# Patient Record
Sex: FEMALE | Race: BLACK OR AFRICAN AMERICAN | NOT HISPANIC OR LATINO | Employment: FULL TIME | URBAN - METROPOLITAN AREA
[De-identification: names, ages, dates, MRNs, and addresses within clinical notes are randomized per-mention and may not be internally consistent; named-entity substitution may affect disease eponyms.]

---

## 2020-01-30 ENCOUNTER — HOSPITAL ENCOUNTER (OUTPATIENT)
Facility: MEDICAL CENTER | Age: 35
End: 2020-01-30
Attending: PREVENTIVE MEDICINE
Payer: COMMERCIAL

## 2020-01-30 ENCOUNTER — EH NON-PROVIDER (OUTPATIENT)
Dept: OCCUPATIONAL MEDICINE | Facility: CLINIC | Age: 35
End: 2020-01-30

## 2020-01-30 DIAGNOSIS — Z02.89 ENCOUNTER FOR OCCUPATIONAL HEALTH EXAMINATION: Primary | ICD-10-CM

## 2020-01-30 DIAGNOSIS — Z02.1 PRE-EMPLOYMENT DRUG SCREENING: ICD-10-CM

## 2020-01-30 DIAGNOSIS — Z02.89 ENCOUNTER FOR OCCUPATIONAL HEALTH EXAMINATION: ICD-10-CM

## 2020-01-30 LAB
AMP AMPHETAMINE: NORMAL
BAR BARBITURATES: NORMAL
BZO BENZODIAZEPINES: NORMAL
COC COCAINE: NORMAL
INT CON NEG: NORMAL
INT CON POS: NORMAL
MDMA ECSTASY: NORMAL
MET METHAMPHETAMINES: NORMAL
MTD METHADONE: NORMAL
OPI OPIATES: NORMAL
OXY OXYCODONE: NORMAL
PCP PHENCYCLIDINE: NORMAL
POC URINE DRUG SCREEN OCDRS: NORMAL
THC: NORMAL

## 2020-01-30 PROCEDURE — 94375 RESPIRATORY FLOW VOLUME LOOP: CPT | Performed by: PREVENTIVE MEDICINE

## 2020-01-30 PROCEDURE — 86480 TB TEST CELL IMMUN MEASURE: CPT | Performed by: PREVENTIVE MEDICINE

## 2020-01-30 PROCEDURE — 86787 VARICELLA-ZOSTER ANTIBODY: CPT | Performed by: PREVENTIVE MEDICINE

## 2020-01-30 PROCEDURE — 80305 DRUG TEST PRSMV DIR OPT OBS: CPT | Performed by: PREVENTIVE MEDICINE

## 2020-01-31 LAB — VZV IGG SER IA-ACNC: 3.2

## 2020-02-02 LAB
GAMMA INTERFERON BACKGROUND BLD IA-ACNC: 0.02 IU/ML
M TB IFN-G BLD-IMP: NEGATIVE
M TB IFN-G CD4+ BCKGRND COR BLD-ACNC: 0 IU/ML
MITOGEN IGNF BCKGRD COR BLD-ACNC: >10 IU/ML
QFT TB2 - NIL TBQ2: 0 IU/ML

## 2020-02-03 ENCOUNTER — HOSPITAL ENCOUNTER (OUTPATIENT)
Facility: MEDICAL CENTER | Age: 35
End: 2020-02-03
Attending: NURSE PRACTITIONER
Payer: COMMERCIAL

## 2020-02-03 ENCOUNTER — EH NON-PROVIDER (OUTPATIENT)
Dept: OCCUPATIONAL MEDICINE | Facility: CLINIC | Age: 35
End: 2020-02-03

## 2020-02-03 DIAGNOSIS — Z02.89 VISIT FOR OCCUPATIONAL HEALTH EXAMINATION: ICD-10-CM

## 2020-02-03 DIAGNOSIS — Z02.89 VISIT FOR OCCUPATIONAL HEALTH EXAMINATION: Primary | ICD-10-CM

## 2020-02-03 LAB
ALBUMIN SERPL BCP-MCNC: 4.4 G/DL (ref 3.2–4.9)
ALBUMIN/GLOB SERPL: 1.1 G/DL
ALP SERPL-CCNC: 73 U/L (ref 30–99)
ALT SERPL-CCNC: 11 U/L (ref 2–50)
ANION GAP SERPL CALC-SCNC: 8 MMOL/L (ref 0–11.9)
AST SERPL-CCNC: 19 U/L (ref 12–45)
BILIRUB SERPL-MCNC: 0.3 MG/DL (ref 0.1–1.5)
BUN SERPL-MCNC: 8 MG/DL (ref 8–22)
CALCIUM SERPL-MCNC: 10 MG/DL (ref 8.5–10.5)
CHLORIDE SERPL-SCNC: 104 MMOL/L (ref 96–112)
CO2 SERPL-SCNC: 23 MMOL/L (ref 20–33)
CREAT SERPL-MCNC: 0.65 MG/DL (ref 0.5–1.4)
GLOBULIN SER CALC-MCNC: 3.9 G/DL (ref 1.9–3.5)
GLUCOSE SERPL-MCNC: 80 MG/DL (ref 65–99)
POTASSIUM SERPL-SCNC: 4 MMOL/L (ref 3.6–5.5)
PROT SERPL-MCNC: 8.3 G/DL (ref 6–8.2)
SODIUM SERPL-SCNC: 135 MMOL/L (ref 135–145)

## 2020-02-03 PROCEDURE — 80053 COMPREHEN METABOLIC PANEL: CPT | Performed by: NURSE PRACTITIONER

## 2020-02-03 PROCEDURE — 80053 COMPREHEN METABOLIC PANEL: CPT

## 2020-02-04 ENCOUNTER — EMPLOYEE HEALTH (OUTPATIENT)
Dept: OCCUPATIONAL MEDICINE | Facility: CLINIC | Age: 35
End: 2020-02-04

## 2020-02-04 DIAGNOSIS — Z02.1 PRE-EMPLOYMENT HEALTH SCREENING EXAMINATION: ICD-10-CM

## 2020-02-04 PROCEDURE — 8915 PR COMPREHENSIVE PHYSICAL: Performed by: NURSE PRACTITIONER

## 2020-10-09 ENCOUNTER — IMMUNIZATION (OUTPATIENT)
Dept: OCCUPATIONAL MEDICINE | Facility: CLINIC | Age: 35
End: 2020-10-09

## 2020-10-09 DIAGNOSIS — Z23 NEED FOR VACCINATION: ICD-10-CM

## 2020-10-09 PROCEDURE — 90686 IIV4 VACC NO PRSV 0.5 ML IM: CPT | Performed by: NURSE PRACTITIONER

## 2020-12-20 DIAGNOSIS — Z23 NEED FOR VACCINATION: ICD-10-CM

## 2021-03-06 ENCOUNTER — EH NON-PROVIDER (OUTPATIENT)
Dept: OCCUPATIONAL MEDICINE | Facility: CLINIC | Age: 36
End: 2021-03-06

## 2021-03-06 DIAGNOSIS — Z02.89 ENCOUNTER FOR OCCUPATIONAL HEALTH EXAMINATION INVOLVING RESPIRATOR: ICD-10-CM

## 2021-03-06 PROCEDURE — 94375 RESPIRATORY FLOW VOLUME LOOP: CPT | Performed by: PREVENTIVE MEDICINE

## 2021-09-22 ENCOUNTER — IMMUNIZATION (OUTPATIENT)
Dept: OCCUPATIONAL MEDICINE | Facility: CLINIC | Age: 36
End: 2021-09-22

## 2021-09-22 DIAGNOSIS — Z23 NEED FOR VACCINATION: Primary | ICD-10-CM

## 2021-09-22 PROCEDURE — 90686 IIV4 VACC NO PRSV 0.5 ML IM: CPT | Performed by: PREVENTIVE MEDICINE

## 2022-02-24 ENCOUNTER — HOSPITAL ENCOUNTER (EMERGENCY)
Facility: MEDICAL CENTER | Age: 37
End: 2022-02-24
Attending: EMERGENCY MEDICINE
Payer: COMMERCIAL

## 2022-02-24 ENCOUNTER — NON-PROVIDER VISIT (OUTPATIENT)
Dept: OCCUPATIONAL MEDICINE | Facility: CLINIC | Age: 37
End: 2022-02-24
Payer: COMMERCIAL

## 2022-02-24 VITALS
WEIGHT: 179.01 LBS | OXYGEN SATURATION: 99 % | BODY MASS INDEX: 28.1 KG/M2 | DIASTOLIC BLOOD PRESSURE: 81 MMHG | RESPIRATION RATE: 15 BRPM | HEIGHT: 67 IN | HEART RATE: 73 BPM | TEMPERATURE: 97.7 F | SYSTOLIC BLOOD PRESSURE: 139 MMHG

## 2022-02-24 DIAGNOSIS — Z02.89 VISIT FOR OCCUPATIONAL HEALTH EXAMINATION: Primary | ICD-10-CM

## 2022-02-24 DIAGNOSIS — Z77.21 EXPOSURE TO BLOOD OR BODY FLUID: ICD-10-CM

## 2022-02-24 LAB
ALBUMIN SERPL BCP-MCNC: 4.6 G/DL (ref 3.2–4.9)
ALBUMIN/GLOB SERPL: 1.2 G/DL
ALP SERPL-CCNC: 80 U/L (ref 30–99)
ALT SERPL-CCNC: 20 U/L (ref 2–50)
ANION GAP SERPL CALC-SCNC: 12 MMOL/L (ref 7–16)
AST SERPL-CCNC: 43 U/L (ref 12–45)
BILIRUB SERPL-MCNC: <0.2 MG/DL (ref 0.1–1.5)
BUN SERPL-MCNC: 18 MG/DL (ref 8–22)
CALCIUM SERPL-MCNC: 9.7 MG/DL (ref 8.5–10.5)
CHLORIDE SERPL-SCNC: 106 MMOL/L (ref 96–112)
CO2 SERPL-SCNC: 20 MMOL/L (ref 20–33)
CREAT SERPL-MCNC: 0.84 MG/DL (ref 0.5–1.4)
ERYTHROCYTE [DISTWIDTH] IN BLOOD BY AUTOMATED COUNT: 39.1 FL (ref 35.9–50)
GLOBULIN SER CALC-MCNC: 3.7 G/DL (ref 1.9–3.5)
GLUCOSE SERPL-MCNC: 87 MG/DL (ref 65–99)
HBV CORE AB SERPL QL IA: NONREACTIVE
HBV SURFACE AB SERPL IA-ACNC: 20 MIU/ML (ref 0–10)
HBV SURFACE AG SER QL: ABNORMAL
HCT VFR BLD AUTO: 30 % (ref 37–47)
HCV AB SER QL: ABNORMAL
HGB BLD-MCNC: 8.6 G/DL (ref 12–16)
HIV 1+2 AB+HIV1 P24 AG SERPL QL IA: ABNORMAL
MCH RBC QN AUTO: 18.9 PG (ref 27–33)
MCHC RBC AUTO-ENTMCNC: 28.7 G/DL (ref 33.6–35)
MCV RBC AUTO: 65.9 FL (ref 81.4–97.8)
MORPHOLOGY BLD-IMP: NORMAL
PLATELET # BLD AUTO: 519 K/UL (ref 164–446)
PMV BLD AUTO: 11.6 FL (ref 9–12.9)
POTASSIUM SERPL-SCNC: 4.3 MMOL/L (ref 3.6–5.5)
PROT SERPL-MCNC: 8.3 G/DL (ref 6–8.2)
RBC # BLD AUTO: 4.55 M/UL (ref 4.2–5.4)
SODIUM SERPL-SCNC: 138 MMOL/L (ref 135–145)
WBC # BLD AUTO: 7.3 K/UL (ref 4.8–10.8)

## 2022-02-24 PROCEDURE — 87389 HIV-1 AG W/HIV-1&-2 AB AG IA: CPT

## 2022-02-24 PROCEDURE — 87340 HEPATITIS B SURFACE AG IA: CPT

## 2022-02-24 PROCEDURE — 80305 DRUG TEST PRSMV DIR OPT OBS: CPT | Performed by: PREVENTIVE MEDICINE

## 2022-02-24 PROCEDURE — 80053 COMPREHEN METABOLIC PANEL: CPT

## 2022-02-24 PROCEDURE — 82075 ASSAY OF BREATH ETHANOL: CPT | Performed by: PREVENTIVE MEDICINE

## 2022-02-24 PROCEDURE — 86706 HEP B SURFACE ANTIBODY: CPT

## 2022-02-24 PROCEDURE — 85027 COMPLETE CBC AUTOMATED: CPT

## 2022-02-24 PROCEDURE — 36415 COLL VENOUS BLD VENIPUNCTURE: CPT

## 2022-02-24 PROCEDURE — 99283 EMERGENCY DEPT VISIT LOW MDM: CPT

## 2022-02-24 PROCEDURE — 86704 HEP B CORE ANTIBODY TOTAL: CPT

## 2022-02-24 PROCEDURE — 86803 HEPATITIS C AB TEST: CPT

## 2022-02-24 NOTE — LETTER
"  FORM C-4:  EMPLOYEE’S CLAIM FOR COMPENSATION/ REPORT OF INITIAL TREATMENT  EMPLOYEE’S CLAIM - PROVIDE ALL INFORMATION REQUESTED   First Name Sara Last Name Nathanael Zavala Birthdate 1985  Sex female Claim Number   Home Address [address]   [facility]             Zip [address]                                   Age  36 y.o. Height  1.702 m (5' 7\") Weight  81.2 kg (179 lb 0.2 oz) [facility]  [id number]  [id number]   Mailing Address [id number]  [facility]              Zip [address] Telephone  [phone] (home)  Primary Language Spoken  English   Insurer   Third Party   WORKERS CHOICE Employee's Occupation (Job Title) When Injury or Occupational Disease Occurred  RN   Employer's Name [employer] Telephone [phone]    Employer Address [address] [[address]] Zip [address]   Date of Injury  2/23/2022       Hour of Injury  7:00 PM Date Employer Notified  2/24/2022 Last Day of Work after Injury or Occupational Disease  2/24/2022 Supervisor to Whom Injury Reported  Caesar Courtney   Address or Location of Accident (if applicable) Work [1]   What were you doing at the time of accident? (if applicable) Helping move a patient and removing tape    How did this injury or occupational disease occur? Be specific and answer in detail. Use additional sheet if necessary)  Was moving a patient and removing tape off of the patient.  Was stuck in the hand by one of the neuromonitoring needles.   If you believe that you have an occupational disease, when did you first have knowledge of the disability and it relationship to your employment? N/A Witnesses to the Accident  Was in room w/ multiple staff   Nature of Injury or Occupational Disease  Workers' Compensation Part(s) of Body Injured or Affected  Hand (L), N/A, N/A    I CERTIFY THAT THE ABOVE IS TRUE AND CORRECT TO THE BEST OF MY KNOWLEDGE AND THAT I HAVE PROVIDED THIS INFORMATION IN ORDER TO OBTAIN THE BENEFITS OF " [place]).  I HEREBY AUTHORIZE ANY PHYSICIAN, CHIROPRACTOR, SURGEON, PRACTITIONER, OR OTHER PERSON, ANY HOSPITAL, INCLUDING [facility] OR [facility], ANY MEDICAL SERVICE ORGANIZATION, ANY INSURANCE COMPANY, OR OTHER INSTITUTION OR ORGANIZATION TO RELEASE TO EACH OTHER, ANY MEDICAL OR OTHER INFORMATION, INCLUDING BENEFITS PAID OR PAYABLE, PERTINENT TO THIS INJURY OR DISEASE, EXCEPT INFORMATION RELATIVE TO DIAGNOSIS, TREATMENT AND/OR COUNSELING FOR AIDS, PSYCHOLOGICAL CONDITIONS, ALCOHOL OR CONTROLLED SUBSTANCES, FOR WHICH I MUST GIVE SPECIFIC AUTHORIZATION.  A PHOTOSTAT OF THIS AUTHORIZATION SHALL BE AS VALID AS THE ORIGINAL.  Date    2/24/2022                          Place              [facility]                                                               Employee’s Signature   THIS REPORT MUST BE COMPLETED AND MAILED WITHIN 3 WORKING DAYS OF TREATMENT   Place [facility], EMERGENCY DEPT                       Name of Facility [facility]   Date  2/24/2022 Diagnosis  (Z77.21) Exposure to blood or body fluid Is there evidence the injured employee was under the influence of alcohol and/or another controlled substance at the time of accident?   Hour  10:59 PM Description of Injury or Disease  Exposure to blood or body fluid No   Treatment  Medical screening  Have you advised the patient to remain off work five days or more?         No   X-Ray Findings    If Yes   From Date    To Date      From information given by the employee, together with medical evidence, can you directly connect this injury or occupational disease as job incurred? Yes If No, is employee capable of: Full Duty  Yes Modified Duty      Is additional medical care by a physician indicated? No If Modified Duty, Specify any Limitations / Restrictions       Do you know of any previous injury or  "disease contributing to this condition or occupational disease? No    Date 2/24/2022 Print Doctor’s Name Anil Smith certify the employer’s copy of this form was mailed on:   Address [address] [place] [address]  [place] [address] [phone] INSURER’S USE ONLY   Provider’s Tax ID Number   Telephone Dept: [phone]    Doctor’s Signature chris-ANIL Thomas M.D., MD      Form C-4 (rev.10/07)                                                                         BRIEF DESCRIPTION OF RIGHTS AND BENEFITS  (Pursuant to [place] 616C.050)    Notice of Injury or Occupational Disease (Incident Report Form C-1): If an injury or occupational disease (OD) arises out of and in the course of employment, you must provide written notice to your employer as soon as practicable, but no later than 7 days after the accident or OD. Your employer shall maintain a sufficient supply of the required forms.    Claim for Compensation (Form C-4): If medical treatment is sought, the form C-4 is available at the place of initial treatment. A completed \"Claim for Compensation\" (Form C-4) must be filed within 90 days after an accident or OD. The treating physician or chiropractor must, within 3 working days after treatment, complete and mail to the employer, the employer's insurer and third-party , the Claim for Compensation.    Medical Treatment: If you require medical treatment for your on-the-job injury or OD, you may be required to select a physician or chiropractor from a list provided by your workers’ compensation insurer, if it has contracted with an Organization for Managed Care (MCO) or Preferred Provider Organization (PPO) or providers of health care. If your employer has not entered into a contract with an MCO or PPO, you may select a physician or chiropractor from the Panel of Physicians and Chiropractors. Any medical costs related to your industrial injury or OD will be paid by your insurer.    Temporary Total " Disability (TTD): If your doctor has certified that you are unable to work for a period of at least 5 consecutive days, or 5 cumulative days in a 20-day period, or places restrictions on you that your employer does not accommodate, you may be entitled to TTD compensation.    Temporary Partial Disability (TPD): If the wage you receive upon reemployment is less than the compensation for TTD to which you are entitled, the insurer may be required to pay you TPD compensation to make up the difference. TPD can only be paid for a maximum of 24 months.    Permanent Partial Disability (PPD): When your medical condition is stable and there is an indication of a PPD as a result of your injury or OD, within 30 days, your insurer must arrange for an evaluation by a rating physician or chiropractor to determine the degree of your PPD. The amount of your PPD award depends on the date of injury, the results of the PPD evaluation, your age and wage.    Permanent Total Disability (PTD): If you are medically certified by a treating physician or chiropractor as permanently and totally disabled and have been granted a PTD status by your insurer, you are entitled to receive monthly benefits not to exceed 66 2/3% of your average monthly wage. The amount of your PTD payments is subject to reduction if you previously received a lump-sum PPD award.    Vocational Rehabilitation Services: You may be eligible for vocational rehabilitation services if you are unable to return to the job due to a permanent physical impairment or permanent restrictions as a result of your injury or occupational disease.    Transportation and Per Denny Reimbursement: You may be eligible for travel expenses and per denny associated with medical treatment.    Reopening: You may be able to reopen your claim if your condition worsens after claim closure.     Appeal Process: If you disagree with a written determination issued by the insurer or the insurer does not respond  to your request, you may appeal to the Department of Administration, , by following the instructions contained in your determination letter. You must appeal the determination within 70 days from the date of the determination letter at [address], [address], [place], [place] [address], or [address], [address], [place], [place] [address]. If you disagree with the  decision, you may appeal to the Department of Administration, . You must file your appeal within 30 days from the date of the  decision letter at [address], [address], [place], [place] [address], or [address], [address], [place], [place] [address]. If you disagree with a decision of an , you may file a petition for judicial review with the District Court. You must do so within 30 days of the Appeal Officer’s decision. You may be represented by an  at your own expense or you may contact the [facility] for possible representation.    [place]  for Injured Workers (NAIW): If you disagree with a  decision, you may request that NAIW represent you without charge at an  Hearing. For information regarding denial of benefits, you may contact the [facility] at: [address], [address], [place] [address], [phone], or [address], [address], [place] [address], [phone]    To File a Complaint with the Division: If you wish to file a complaint with the  of the Division of Industrial Relations (DIR),  please contact the Workers’ Compensation Section, [address], [address], [place], [place] [address], telephone [phone], or [address], [place], [place] [address], telephone [phone].    For assistance with Workers’ Compensation Issues: You may contact the [facility] Office for Consumer Health Assistance, [address], [place] [address], Toll Free 1-542.570.2373, Web site: http://[facility].[place].gov/Programs/SHER E-mail: sher@[facility].[place].gov  D-2 (rev. 10/20)              __________________________________________________________________                                    _________________            Employee Name / Signature                                                                                                                            Date

## 2022-02-25 LAB
AMP AMPHETAMINE: NORMAL
BAR BARBITURATES: NORMAL
BREATH ALCOHOL COMMENT: NORMAL
BZO BENZODIAZEPINES: NORMAL
COC COCAINE: NORMAL
INT CON NEG: NORMAL
INT CON POS: NORMAL
MDMA ECSTASY: NORMAL
MET METHAMPHETAMINES: NORMAL
MTD METHADONE: NORMAL
OPI OPIATES: NORMAL
OXY OXYCODONE: NORMAL
PCP PHENCYCLIDINE: NORMAL
POC BREATHALIZER: 0 PERCENT (ref 0–0.01)
POC URINE DRUG SCREEN OCDRS: NORMAL
THC: NORMAL

## 2022-02-25 NOTE — ED TRIAGE NOTES
"  Chief Complaint   Patient presents with   • Body Fluid Exposure     2/23 @ 1900     Pt to triage for above complaint. Pt [employer] employee, states she was moving a pt in OR last night and sustained needle stick in the hand. Workers comp. paperwork provided.     Pt is alert/oriented, following commands, and answering questions appropriately. No acute distress noted in triage and respirations are even and unlabored.   Pt placed in lobby and educated on triage process. Pt encouraged to alert staff for any changes in condition.    .BP (!) 198/122 Comment: Left arm. RN notified.  Pulse 95   Temp 36.8 °C (98.2 °F) (Temporal)   Resp 18   Ht 1.702 m (5' 7\")   Wt 81.2 kg (179 lb 0.2 oz)   LMP 02/10/2022   SpO2 100% Comment: Room air  BMI 28.04 kg/m²       "

## 2022-02-25 NOTE — ED PROVIDER NOTES
"ED Provider Note    CHIEF COMPLAINT  Chief Complaint   Patient presents with   • Body Fluid Exposure     2/23 @ 1900       HPI  Sara Zavala is a 36 y.o. female who presents to the emergency department after accidental needlestick while at work. 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 This occurred last night. Tonight decided that she should be seen formally due to the fact that this was a work-related incident. Otherwise feels well. States that the accidental needlestick site is unable to be visualized at this point.      REVIEW OF SYSTEMS  See HPI for further details. All other systems are negative.     PAST MEDICAL HISTORY   has a past medical history of Hypertension.    SOCIAL HISTORY  Social History     Tobacco Use   • Smoking status: Never Smoker   • Smokeless tobacco: Never Used   Vaping Use   • Vaping Use: Never used   Substance and Sexual Activity   • Alcohol use: Yes     Comment: Occaisonally    • Drug use: Never   • Sexual activity: Not on file       SURGICAL HISTORY  patient denies any surgical history    CURRENT MEDICATIONS  Home Medications     Reviewed by Raiza Yanez R.N. (Registered Nurse) on 02/24/22 at 3590  Med List Status: <None>   Medication Last Dose Status        Patient Jean-Pierre Taking any Medications                       ALLERGIES  No Known Allergies    PHYSICAL EXAM  VITAL SIGNS: BP (!) 198/122 Comment: Left arm. RN notified.  Pulse 95   Temp 36.8 °C (98.2 °F) (Temporal)   Resp 18   Ht 1.702 m (5' 7\")   Wt 81.2 kg (179 lb 0.2 oz)   LMP 02/10/2022   SpO2 100% Comment: Room air  BMI 28.04 kg/m²  @[place][[id number]::@  Pulse ox interpretation: I interpret this pulse ox as normal.  Constitutional: Alert in no apparent distress.  HENT: Normocephalic, Atraumatic  Lungs: no resp distress.   Skin: Warm, Dry.   Left hand: patient localizes needlestick site to the base of the fifth digit " on the lateral/ulnar palm are side of the hand.   Neurologic: Alert, Grossly non-focal.   Psychiatric: Affect normal, Judgment normal, Mood normal, Appears appropriate and not intoxicated.     Results for orders placed or performed during the hospital encounter of 02/24/22   BLOOD AND BODY FLUID EXPOSURE (EXPOSED- SOURCE PATIENT POS OR UNKNOWN)   Result Value Ref Range    HIV Ag/Ab Combo Assay Non-Reactive Non Reactive    Hepatitis C Antibody Non-Reactive Non-Reactive    Hepatitis B Surface Antigen Non-Reactive Non-Reactive    Hep B Surface Antibody Quant 20.00 (H) 0.00 - 10.00 mIU/mL    Hepatitis B Core Ab, Total NonReactive Non-Reactive    WBC 7.3 4.8 - 10.8 K/uL    RBC 4.55 4.20 - 5.40 M/uL    Hemoglobin 8.6 (L) 12.0 - 16.0 g/dL    Hematocrit 30.0 (L) 37.0 - 47.0 %    MCV 65.9 (L) 81.4 - 97.8 fL    MCH 18.9 (L) 27.0 - 33.0 pg    MCHC 28.7 (L) 33.6 - 35.0 g/dL    RDW 39.1 35.9 - 50.0 fL    Platelet Count 519 (H) 164 - 446 K/uL    MPV 11.6 9.0 - 12.9 fL    Sodium 138 135 - 145 mmol/L    Potassium 4.3 3.6 - 5.5 mmol/L    Chloride 106 96 - 112 mmol/L    Co2 20 20 - 33 mmol/L    Anion Gap 12.0 7.0 - 16.0    Glucose 87 65 - 99 mg/dL    Bun 18 8 - 22 mg/dL    Creatinine 0.84 0.50 - 1.40 mg/dL    Calcium 9.7 8.5 - 10.5 mg/dL    AST(SGOT) 43 12 - 45 U/L    ALT(SGPT) 20 2 - 50 U/L    Alkaline Phosphatase 80 30 - 99 U/L    Total Bilirubin <0.2 0.1 - 1.5 mg/dL    Albumin 4.6 3.2 - 4.9 g/dL    Total Protein 8.3 (H) 6.0 - 8.2 g/dL    Globulin 3.7 (H) 1.9 - 3.5 g/dL    A-G Ratio 1.2 g/dL   ESTIMATED GFR   Result Value Ref Range    GFR If African American >60 >60 mL/min/1.73 m 2    GFR If Non African American >60 >60 mL/min/1.73 m 2   PERIPHERAL SMEAR REVIEW   Result Value Ref Range    Peripheral Smear Review see below            COURSE & MEDICAL DECISION MAKING  Pertinent Labs & Imaging studies reviewed. (See chart for details)  36-year-old female presented the emergency department for accidental needlestick while at work here  in the hospital. History as above. Laboratory evaluations were initiated. She will follow-up with occupational health for further treatment if needed. Laboratory evaluation as above currently reassuring. Through sure decision-making we have deferred from antivirals at this time.       The patient will return for worsening symptoms and is stable at the time of discharge. The patient verbalizes understanding and will comply.    FINAL IMPRESSION  1. Exposure to blood or body fluid               Electronically signed by: Anil Smith M.D., 2/24/2022 10:41 PM

## 2022-02-25 NOTE — ED NOTES
Pt A/Ox3 unlaboured breathing VSS. Agrees and understands d/c teaching. Ambulated off unit independently

## 2022-03-14 ENCOUNTER — OCCUPATIONAL MEDICINE (OUTPATIENT)
Dept: OCCUPATIONAL MEDICINE | Facility: CLINIC | Age: 37
End: 2022-03-14
Payer: COMMERCIAL

## 2022-03-14 VITALS
WEIGHT: 179 LBS | BODY MASS INDEX: 28.09 KG/M2 | DIASTOLIC BLOOD PRESSURE: 120 MMHG | HEIGHT: 67 IN | SYSTOLIC BLOOD PRESSURE: 210 MMHG

## 2022-03-14 DIAGNOSIS — Z77.21 EXPOSURE TO BLOOD OR BODY FLUID: ICD-10-CM

## 2022-03-14 PROCEDURE — 99213 OFFICE O/P EST LOW 20 MIN: CPT | Performed by: NURSE PRACTITIONER

## 2022-03-14 ASSESSMENT — FIBROSIS 4 INDEX: FIB4 SCORE: 0.67

## 2022-03-14 NOTE — PROGRESS NOTES
"Subjective:     Sara Zavala is a 36 y.o. 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 Patient seen in Urgent Care x 1. Baseline labs reviewed negative for Hep C and HIV.  Patient outside of HIV prophylaxis window for treatment.  She verbalized her understanding.  She states that the source labs have been drawn she will attempt to get MRN from supervisor.  Tdap is up-to-date.  Discussed CDC postexposure repeat testing guidelines.  Patient is amenable to this.  She is tolerating full duty without difficulty.  Plan of care discussed with patient.     Source MRN # [id number] negative for Hep C and HIV    ROS: All systems were reviewed on intake form, form was reviewed and signed. See scanned documents in media. Pertinent positives and negatives included in HPI.    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  FH: No pertinent family history to this problem       Objective:     BP (!) 210/120   Ht 1.702 m (5' 7\")   Wt 81.2 kg (179 lb)   BMI 28.04 kg/m²     [unfilled]    Left hand: No gross deformity or discoloration noted.  No signs and symptoms of infection.  Skin is clean, dry, intact.    Assessment/Plan:       1. Exposure to blood or body fluid  - HIV ANITBODIES; Future  - HEP C VIRUS ANITBODY; Future    Released to Full Duty FROM 3/14/2022 TO 4/7/2022     Follow-up at 6 weeks post exposure  Full duty  Tdap up-to-date  HIV and hepatitis C labs ordered please have drawn approximately 2 to 3 days prior to next visit  Source MRN pending and HIV and hepatitis C status unknown  Will follow CDC postexposure   guidelines repeat testing at 6 weeks, 3 months, and 6 months    Differential diagnosis, natural history, supportive care, and " indications for immediate follow-up discussed.    Approximately 25 minutes were spent in reviewing notes, preparing for visit, obtaining history, exam and evaluation, patient counseling/education and post visit documentation/orders.

## 2022-03-14 NOTE — LETTER
[address],   [address] [place] [address]  Phone:  [phone] - Fax:  [phone]   Occupational Health [facility] Progress Report and Disability Certification  Date of Service: 3/14/2022   No Show:  No  Date / Time of Next Visit: 4/7/2022@ 10:15 AM    Claim Information   Patient Name: Sara Zavala  Claim Number:     Employer: [employer]  Date of Injury: 2/23/2022     Insurer / TPA: Workers Choice  ID / SSN:     Occupation: Nurse  Diagnosis: The encounter diagnosis was Exposure to blood or body fluid.    Medical Information   Related to Industrial Injury? 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 Patient seen in Urgent Care x 1. Baseline labs reviewed negative for Hep C and HIV.  Patient outside of HIV prophylaxis window for treatment.  She verbalized her understanding.  She states that the source labs have been drawn she will attempt to get MRN from supervisor.  Tdap is up-to-date.  Discussed CDC postexposure repeat testing guidelines.  Patient is amenable to this.  She is tolerating full duty without difficulty.  Plan of care discussed with patient.   Objective Findings: Left hand: No gross deformity or discoloration noted.  No signs and symptoms of infection.  Skin is clean, dry, intact.   Pre-Existing Condition(s):     Assessment:   Condition Improved    Status: Additional Care Required  Permanent Disability:No    Plan: Diagnostics    Diagnostics: Laboratory    Comments:  Follow-up at 6 weeks post exposure  Full duty  Tdap up-to-date  HIV and hepatitis C labs ordered please have drawn approximately 2 to 3 days prior to next visit  Source MRN pending and HIV and hepatitis C status unknown  Will follow CDC postexposure   guidelines repeat testing at 6 weeks, 3 months, and 6 months    Disability Information    Status: Released to Full Duty    From:  3/14/2022  Through: 4/7/2022 Restrictions are:     Physical Restrictions   Sitting:    Standing:    Stooping:    Bending:      Squatting:    Walking:    Climbing:    Pushing:      Pulling:    Other:    Reaching Above Shoulder (L):   Reaching Above Shoulder (R):       Reaching Below Shoulder (L):    Reaching Below Shoulder (R):      Not to exceed Weight Limits   Carrying(hrs):   Weight Limit(lb):   Lifting(hrs):   Weight  Limit(lb):     Comments:      Repetitive Actions   Hands: i.e. Fine Manipulations from Grasping:     Feet: i.e. Operating Foot Controls:     Driving / Operate Machinery:     Health Care Provider’s Original or Electronic Signature  FAIZAN Mcmahon Health Care Provider’s Original or Electronic Signature    Moses Wadsworth MD         Clinic Name / Location: [address],   [address] [place] [address] Clinic Phone Number: Dept: [phone]   Appointment Time: 11:30 Am Visit Start Time: 11:35 AM   Check-In Time:  11:32 Am Visit Discharge Time: 11:50 AM    Original-Treating Physician or Chiropractor    Page 2-Insurer/TPA    Page 3-Employer    Page 4-Employee

## 2022-03-24 ENCOUNTER — OCCUPATIONAL MEDICINE (OUTPATIENT)
Dept: OCCUPATIONAL MEDICINE | Facility: CLINIC | Age: 37
End: 2022-03-24
Payer: COMMERCIAL

## 2022-03-24 DIAGNOSIS — Z77.21 EXPOSURE TO BLOOD OR BODY FLUID: ICD-10-CM

## 2022-03-24 PROCEDURE — 99441 PR PHYSICIAN TELEPHONE EVALUATION 5-10 MIN: CPT | Mod: 95 | Performed by: NURSE PRACTITIONER

## 2022-03-24 NOTE — LETTER
[address],   [address] [address]  Phone:  [phone] - Fax:  [phone]   [facility] Progress Report and Disability Certification  Date of Service: 3/24/2022   No Show:  No  Date / Time of Next Visit:  Discharged/MMI Released to Full Duty    Claim Information   Patient Name: Sara Zavala  Claim Number:     Employer: [employer] Date of Injury: 2/23/2022     Insurer / TPA: Workers Choice  ID / SSN:     Occupation: Nurse  Diagnosis: The encounter diagnosis was Exposure to blood or body fluid.    Medical Information   Related to Industrial Injury? 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    Telemedicine Visit: Established Patient     This encounter was conducted via telephone.   Verbal consent was obtained. Patient's identity was verified.    The patient remains asymptomatic.  Baseline labs reviewed negative for Hep C and HIV.  Patient able to obtain source MRN.  Source hep C and HIV labs negative. She verbalized her understanding.  No further follow-up indicated at this time per CDC postexposure guidelines.  Plan of care discussed with patient.   Objective Findings: No signs and symptoms of infection    Pre-Existing Condition(s):     Assessment:   Condition Improved    Status: Discharged /  MMI  Permanent Disability:No    Plan:      Diagnostics:      Comments:  Discharged/MMI   Released from care  Follow-up if needed     Disability Information   Status: Released to Full Duty    From:  3/24/2022  Through:   Restrictions are:     Physical Restrictions   Sitting:    Standing:    Stooping:    Bending:      Squatting:    Walking:    Climbing:    Pushing:      Pulling:    Other:    Reaching Above Shoulder (L):   Reaching Above Shoulder (R):       Reaching Below Shoulder (L):    Reaching  Below Shoulder (R):      Not to exceed Weight Limits   Carrying(hrs):   Weight Limit(lb):   Lifting(hrs):   Weight  Limit(lb):     Comments:      Repetitive Actions   Hands: i.e. Fine Manipulations from Grasping:     Feet: i.e. Operating Foot Controls:     Driving / Operate Machinery:     Health Care Provider’s Original or Electronic Signature  FAIZAN Mcmahon Health Care Provider’s Original or Electronic Signature    Moses Wadsworth MD         Clinic Name / Location: [address] [address] Clinic Phone Number: Dept: [phone]   Appointment Time: 10:00 Am Visit Start Time: 10:14 AM   Check-In Time:  10:09 Am Visit Discharge Time:  10:22am   Original-Treating Physician or Chiropractor    Page 2-Insurer/TPA    Page 3-Employer    Page 4-Employee

## 2022-03-24 NOTE — PROGRESS NOTES
Telephone Appointment Visit    This telephone visit was initiated by the patient and they verbally consented.    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     Labs / Images Reviewed:   Baseline and Source labs reviewed      Assessment and Plan:     1. Exposure to blood or body fluid      Follow-up:   Discharged/MMI   Released from care   Follow-up if needed     Total Time Spent (mins): 6 minutes     ALISA Mcmahon.